# Patient Record
Sex: MALE | Race: WHITE | NOT HISPANIC OR LATINO
[De-identification: names, ages, dates, MRNs, and addresses within clinical notes are randomized per-mention and may not be internally consistent; named-entity substitution may affect disease eponyms.]

---

## 2019-09-11 PROBLEM — Z00.00 ENCOUNTER FOR PREVENTIVE HEALTH EXAMINATION: Status: ACTIVE | Noted: 2019-09-11

## 2019-10-24 ENCOUNTER — APPOINTMENT (OUTPATIENT)
Dept: HEMATOLOGY ONCOLOGY | Facility: CLINIC | Age: 79
End: 2019-10-24
Payer: MEDICARE

## 2019-10-24 NOTE — HISTORY OF PRESENT ILLNESS
[de-identified] : 78 year old male refereed by Dr. Omer Norman for initial consultation to rule out possible MDS versus dendritic cell leukemia/sarcoma.  Patient had asymptomatic dermatitis on his forearms intermittently over the past 6 months.  The dermatitis consists of one to two centimeters minimally elevated violet-pink plaques.  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  Flow cytometry was sent on 9/10/19 by Dr. Norman which showed no morphological or immunophenotypic evidence of lymphoproliferate disorder. high grade MDS or Acute leukemia.  Hgb 11.1, HCT 35.5, MCH 24.8, ESR 90, CRP 83

## 2019-11-19 ENCOUNTER — RESULT REVIEW (OUTPATIENT)
Age: 79
End: 2019-11-19

## 2019-11-19 ENCOUNTER — APPOINTMENT (OUTPATIENT)
Dept: HEMATOLOGY ONCOLOGY | Facility: CLINIC | Age: 79
End: 2019-11-19
Payer: MEDICARE

## 2019-11-19 VITALS
TEMPERATURE: 97.9 F | WEIGHT: 142.6 LBS | DIASTOLIC BLOOD PRESSURE: 71 MMHG | BODY MASS INDEX: 22.12 KG/M2 | RESPIRATION RATE: 18 BRPM | HEIGHT: 67.13 IN | OXYGEN SATURATION: 100 % | HEART RATE: 93 BPM | SYSTOLIC BLOOD PRESSURE: 131 MMHG

## 2019-11-19 DIAGNOSIS — Z85.46 PERSONAL HISTORY OF MALIGNANT NEOPLASM OF PROSTATE: ICD-10-CM

## 2019-11-19 DIAGNOSIS — Z82.49 FAMILY HISTORY OF ISCHEMIC HEART DISEASE AND OTHER DISEASES OF THE CIRCULATORY SYSTEM: ICD-10-CM

## 2019-11-19 DIAGNOSIS — Z81.1 FAMILY HISTORY OF ALCOHOL ABUSE AND DEPENDENCE: ICD-10-CM

## 2019-11-19 DIAGNOSIS — Z80.42 FAMILY HISTORY OF MALIGNANT NEOPLASM OF PROSTATE: ICD-10-CM

## 2019-11-19 DIAGNOSIS — Z78.9 OTHER SPECIFIED HEALTH STATUS: ICD-10-CM

## 2019-11-19 DIAGNOSIS — Z86.79 PERSONAL HISTORY OF OTHER DISEASES OF THE CIRCULATORY SYSTEM: ICD-10-CM

## 2019-11-19 PROCEDURE — 99205 OFFICE O/P NEW HI 60 MIN: CPT

## 2019-11-19 RX ORDER — HYDROXYCHLOROQUINE SULFATE 200 MG/1
200 TABLET ORAL
Refills: 0 | Status: ACTIVE | COMMUNITY

## 2019-11-19 NOTE — ASSESSMENT
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  \par \par Bone density - due now, followed by Dr. Fay Radford \par \par Patient has anemia with Hg 11 gm/dl.  Denies blood loss, c/o fatigue. \par \par Skin biopsy c/w histiocytoid Sweet's Syndrome which might correlate with myelodysplastic syndrome or dendritic cell leukemia/ sarcoma.  \par \par Discussed with patient indication for bone marrow biopsy with NGS. \par Flow cytometry and FISH for MDS for peripheral blood negative but overall low yield for MDS.

## 2019-11-19 NOTE — PHYSICAL EXAM
[Fully active, able to carry on all pre-disease performance without restriction] : Status 0 - Fully active, able to carry on all pre-disease performance without restriction [Normal] : normal appearance, no rash, nodules, vesicles, ulcers, erythema [de-identified] : skin rash

## 2019-11-19 NOTE — HISTORY OF PRESENT ILLNESS
[de-identified] : 78 year old male refereed by Dr. Omer Norman for initial consultation to rule out possible MDS versus dendritic cell leukemia/sarcoma.  Patient had asymptomatic dermatitis on his forearms intermittently over the past 6 months.  The dermatitis consists of one to two centimeters minimally elevated violet-pink plaques.  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  Flow cytometry was sent on 9/10/19 by Dr. Norman which showed no morphological or immunophenotypic evidence of lymphoproliferate disorder, high grade MDS or Acute leukemia.  Hgb 11.1, HCT 35.5, MCH 24.8, ESR 90, CRP 83\par \par Hgb 11.1, HCT 35.5, MCH 24.8, ESR 90, CRP 83.  Patient sees GI had capsule study and up to date with coloscopy was told he had a little "leak" sees Dr. Aguilar, he also reports losing 10-12 lbs over the past 6 years.  He reports having a personal history of prostate cancer in 2002 and had a prostatectomy.  His brother has prostate cancer, denies any other family history of malignant or hematological disorders.  \par \par He follows with Dr. Radford for a connective tissue disorder.

## 2019-11-21 ENCOUNTER — TRANSCRIPTION ENCOUNTER (OUTPATIENT)
Age: 79
End: 2019-11-21

## 2019-11-24 ENCOUNTER — RESULT REVIEW (OUTPATIENT)
Age: 79
End: 2019-11-24

## 2019-11-25 ENCOUNTER — APPOINTMENT (OUTPATIENT)
Dept: HEMATOLOGY ONCOLOGY | Facility: CLINIC | Age: 79
End: 2019-11-25
Payer: MEDICARE

## 2019-11-25 VITALS
DIASTOLIC BLOOD PRESSURE: 75 MMHG | TEMPERATURE: 97.9 F | HEART RATE: 100 BPM | SYSTOLIC BLOOD PRESSURE: 136 MMHG | RESPIRATION RATE: 20 BRPM | HEIGHT: 67.13 IN | WEIGHT: 140.99 LBS | OXYGEN SATURATION: 100 % | BODY MASS INDEX: 21.87 KG/M2

## 2019-11-25 PROCEDURE — 99214 OFFICE O/P EST MOD 30 MIN: CPT | Mod: 25

## 2019-11-25 PROCEDURE — 38222 DX BONE MARROW BX & ASPIR: CPT | Mod: LT

## 2019-11-25 NOTE — CONSULT LETTER
[Dear  ___] : Dear  [unfilled], [Consult Letter:] : I had the pleasure of evaluating your patient, [unfilled]. [Please see my note below.] : Please see my note below. [Consult Closing:] : Thank you very much for allowing me to participate in the care of this patient.  If you have any questions, please do not hesitate to contact me. [Sincerely,] : Sincerely, [FreeTextEntry3] : Brittany Bahena MD\par [facility] [place] at [facility]\par  [DrIveth  ___] : Dr. BETANCOURT [DrIveth ___] : Dr. BETANCOURT

## 2019-11-25 NOTE — HISTORY OF PRESENT ILLNESS
[de-identified] : 78 year old male refereed by Dr. Omer Norman for initial consultation to rule out possible MDS versus dendritic cell leukemia/sarcoma.  Patient had asymptomatic dermatitis on his forearms intermittently over the past 6 months.  The dermatitis consists of one to two centimeters minimally elevated violet-pink plaques.  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  Flow cytometry was sent on 9/10/19 by Dr. Norman which showed no morphological or immunophenotypic evidence of lymphoproliferate disorder, high grade MDS or Acute leukemia.  Hgb 11.1, HCT 35.5, MCH 24.8, ESR 90, CRP 83\par \par Hgb 11.1, HCT 35.5, MCH 24.8, ESR 90, CRP 83.  Patient sees GI had capsule study and up to date with coloscopy was told he had a little "leak" sees Dr. Aguilar, he also reports losing 10-12 lbs over the past 6 years.  He reports having a personal history of prostate cancer in 2002 and had a prostatectomy.  His brother has prostate cancer, denies any other family history of malignant or hematological disorders.  \par \par He follows with Dr. Radford for a connective tissue disorder.

## 2019-11-25 NOTE — PROCEDURE
[Bone Marrow Aspiration] : bone marrow aspiration  [Bone Marrow Biopsy] : bone marrow biopsy [Patient] : the patient [Verbal Consent Obtained] : verbal consent was obtained prior to the procedure [Patient identification verified] : patient identification verified [Procedure verified and consent obtained] : procedure verified and consent obtained [Laterality verified and correct site marked] : laterality verified and correct site marked [Left] : site: left [Correct positioning] : correct positioning [Prone] : prone [Correct implant and/ or special equipment obtained] : correct impact and/ or special equipment obtained [Superior iliac spine was identified] : the superior iliac spine was identified. [The left posterior iliac crest was prepped with betadine and draped, using sterile technique.] : The left posterior iliac crest was prepped with betadine and draped, using sterile technique. [Lidocaine was injected and into the periosteum overlying the site.] : Lidocaine was injected and into the periosteum overlying the site. [Aspirate] : aspirate [Cytogenetics] : cytogenetics [FISH] : FISH [Biopsy] : biopsy [Flow Cytometry] : flow cytometry [FreeTextEntry1] : Sweet Syndrome, Anemia

## 2019-11-25 NOTE — PHYSICAL EXAM
[Fully active, able to carry on all pre-disease performance without restriction] : Status 0 - Fully active, able to carry on all pre-disease performance without restriction [Normal] : affect appropriate [de-identified] : skin rash

## 2019-11-25 NOTE — ASSESSMENT
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  \par \par Bone density - due now, followed by Dr. Fay Radford \par \par Patient has anemia with Hg 11 gm/dl.  Denies blood loss, c/o fatigue. \par \par Skin biopsy c/w histiocytoid Sweet's Syndrome which might correlate with myelodysplastic syndrome or dendritic cell leukemia/ sarcoma.  \par \par Discussed with patient indication for bone marrow biopsy with NGS. \par Flow cytometry and FISH for MDS for peripheral blood negative but overall low yield for MDS. \par \par \par Bone marrow biopsy today \par Labs reviewed - elevated inflammatory markers

## 2019-12-09 ENCOUNTER — RESULT REVIEW (OUTPATIENT)
Age: 79
End: 2019-12-09

## 2019-12-09 ENCOUNTER — APPOINTMENT (OUTPATIENT)
Dept: HEMATOLOGY ONCOLOGY | Facility: CLINIC | Age: 79
End: 2019-12-09
Payer: MEDICARE

## 2019-12-09 VITALS
HEART RATE: 73 BPM | RESPIRATION RATE: 18 BRPM | WEIGHT: 146.98 LBS | SYSTOLIC BLOOD PRESSURE: 137 MMHG | OXYGEN SATURATION: 100 % | BODY MASS INDEX: 22.8 KG/M2 | HEIGHT: 67.13 IN | DIASTOLIC BLOOD PRESSURE: 69 MMHG | TEMPERATURE: 97.6 F

## 2019-12-09 DIAGNOSIS — E78.5 HYPERLIPIDEMIA, UNSPECIFIED: ICD-10-CM

## 2019-12-09 PROCEDURE — 99214 OFFICE O/P EST MOD 30 MIN: CPT

## 2019-12-10 NOTE — REASON FOR VISIT
[Follow-Up Visit] : a follow-up visit for [Myelodysplastic syndrome] : myelodysplastic syndrome [FreeTextEntry2] : Anemia, MDS

## 2019-12-10 NOTE — CONSULT LETTER
[Dear  ___] : Dear  [unfilled], [Consult Letter:] : I had the pleasure of evaluating your patient, [unfilled]. [Please see my note below.] : Please see my note below. [Consult Closing:] : Thank you very much for allowing me to participate in the care of this patient.  If you have any questions, please do not hesitate to contact me. [Sincerely,] : Sincerely, [DrIveth  ___] : Dr. BETANCOURT [DrIveth ___] : Dr. BETANCOURT [FreeTextEntry3] : Brtitany Bahena MD\par [facility] [place] at [facility]\par

## 2019-12-10 NOTE — PHYSICAL EXAM
[Fully active, able to carry on all pre-disease performance without restriction] : Status 0 - Fully active, able to carry on all pre-disease performance without restriction [Normal] : affect appropriate [de-identified] : skin rash

## 2019-12-10 NOTE — ASSESSMENT
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  \par Skin biopsy c/w histiocytoid Sweet's Syndrome which might correlate with myelodysplastic syndrome or dendritic cell leukemia/ sarcoma.\par \par Bone marrow biopsy - hypercellular marrow with normal FISH and absent iron stores.\par Reviewed with patient -c/w MDS, will replace IV iron prior to starting Aranesp.\par Request NGS \par Hemoglobin declined to 9.9, reviewed GI work up - colonoscopy 2-3 years ago, - EGD, colonoscopy and capsule study.\par   \par \par \par

## 2019-12-10 NOTE — HISTORY OF PRESENT ILLNESS
[de-identified] : 78 year old male refereed by Dr. Omer Norman for initial consultation to rule out possible MDS versus dendritic cell leukemia/sarcoma.  Patient had asymptomatic dermatitis on his forearms intermittently over the past 6 months.  The dermatitis consists of one to two centimeters minimally elevated violet-pink plaques.  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  Flow cytometry was sent on 9/10/19 by Dr. Norman which showed no morphological or immunophenotypic evidence of lymphoproliferate disorder, high grade MDS or Acute leukemia.  Hgb 11.1, HCT 35.5, MCH 24.8, ESR 90, CRP 83\par \par Hgb 11.1, HCT 35.5, MCH 24.8, ESR 90, CRP 83.  Patient sees GI had capsule study and up to date with coloscopy was told he had a little "leak" sees Dr. Aguilar, he also reports losing 10-12 lbs over the past 6 years.  He reports having a personal history of prostate cancer in 2002 and had a prostatectomy.  His brother has prostate cancer, denies any other family history of malignant or hematological disorders.  \par \par He follows with Dr. Radford for a connective tissue disorder.   [de-identified] : Patient being seen for visit s/p bone marrow biopsy. Offers no complaints at this time.

## 2020-01-09 ENCOUNTER — APPOINTMENT (OUTPATIENT)
Dept: HEMATOLOGY ONCOLOGY | Facility: CLINIC | Age: 80
End: 2020-01-09
Payer: MEDICARE

## 2020-01-09 ENCOUNTER — RESULT REVIEW (OUTPATIENT)
Age: 80
End: 2020-01-09

## 2020-01-09 VITALS
OXYGEN SATURATION: 100 % | HEART RATE: 84 BPM | BODY MASS INDEX: 22.6 KG/M2 | TEMPERATURE: 97.9 F | RESPIRATION RATE: 16 BRPM | SYSTOLIC BLOOD PRESSURE: 105 MMHG | WEIGHT: 145.7 LBS | HEIGHT: 67.13 IN | DIASTOLIC BLOOD PRESSURE: 58 MMHG

## 2020-01-09 PROCEDURE — 99214 OFFICE O/P EST MOD 30 MIN: CPT

## 2020-01-09 NOTE — HISTORY OF PRESENT ILLNESS
[de-identified] : 78 year old male refereed by Dr. Omer Norman for initial consultation to rule out possible MDS versus dendritic cell leukemia/sarcoma.  Patient had asymptomatic dermatitis on his forearms intermittently over the past 6 months.  The dermatitis consists of one to two centimeters minimally elevated violet-pink plaques.  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  Flow cytometry was sent on 9/10/19 by Dr. Norman which showed no morphological or immunophenotypic evidence of lymphoproliferate disorder, high grade MDS or Acute leukemia.  Hgb 11.1, HCT 35.5, MCH 24.8, ESR 90, CRP 83\par \par Hgb 11.1, HCT 35.5, MCH 24.8, ESR 90, CRP 83.  Patient sees GI had capsule study and up to date with coloscopy was told he had a little "leak" sees Dr. Aguilar, he also reports losing 10-12 lbs over the past 6 years.  He reports having a personal history of prostate cancer in 2002 and had a prostatectomy.  His brother has prostate cancer, denies any other family history of malignant or hematological disorders.  \par \par He follows with Dr. Radford for a connective tissue disorder.   [de-identified] : Patient being seen for follow up of MDS- s/p iron infusion states no improvement.  Having lower back pain s/p epidural

## 2020-01-09 NOTE — CONSULT LETTER
[FreeTextEntry3] : Brittany Bahena MD\par [facility] [place] at [facility]\par

## 2020-01-09 NOTE — ASSESSMENT
[FreeTextEntry1] : 79 yo male with history 12 years with sweats, fevers,  dermatitis, weight loss diagnosed with undifferentiated mixed connective tissue disease treated with prednisone 5mg.  \par Skin biopsy c/w histiocytoid Sweet's Syndrome which might correlate with myelodysplastic syndrome or dendritic cell leukemia/ sarcoma.\par \par Bone marrow biopsy - hypercellular marrow with normal FISH and absent iron stores.\par Reviewed with patient -c/w MDS, will replace IV iron prior to starting Aranesp.\par \par Hemoglobin declined to 9.9, reviewed GI work up - colonoscopy 2-3 years ago, - EGD, colonoscopy and capsule study.Colonoscopy 2017 - Diverticulosis and internal hemorrhoids \par EGD - 2019, [facility]  \par Hg 10.9 1/9/2020, saturation low 9% - s/p one IV iron, schedule 2nd dose \par Elevated inflammatory markers ESR, CRP  - cc Dr. Fay Radford

## 2020-02-13 ENCOUNTER — APPOINTMENT (OUTPATIENT)
Dept: HEMATOLOGY ONCOLOGY | Facility: CLINIC | Age: 80
End: 2020-02-13
Payer: MEDICARE

## 2020-02-13 ENCOUNTER — RESULT REVIEW (OUTPATIENT)
Age: 80
End: 2020-02-13

## 2020-02-13 VITALS
SYSTOLIC BLOOD PRESSURE: 119 MMHG | HEART RATE: 79 BPM | RESPIRATION RATE: 16 BRPM | WEIGHT: 145 LBS | DIASTOLIC BLOOD PRESSURE: 61 MMHG | OXYGEN SATURATION: 99 % | TEMPERATURE: 98.8 F | HEIGHT: 67.13 IN | BODY MASS INDEX: 22.49 KG/M2

## 2020-02-13 PROCEDURE — 99214 OFFICE O/P EST MOD 30 MIN: CPT

## 2020-02-13 NOTE — PHYSICAL EXAM
[Fully active, able to carry on all pre-disease performance without restriction] : Status 0 - Fully active, able to carry on all pre-disease performance without restriction [Normal] : grossly intact [de-identified] : skin rash

## 2020-02-13 NOTE — REASON FOR VISIT
[Myelodysplastic syndrome] : myelodysplastic syndrome [Follow-Up Visit] : a follow-up visit for [FreeTextEntry2] : Anemia, MDS

## 2020-02-13 NOTE — REVIEW OF SYSTEMS
[Fatigue] : fatigue [Muscle Pain] : muscle pain [Negative] : Allergic/Immunologic [FreeTextEntry9] : lower back pain

## 2020-02-13 NOTE — CONSULT LETTER
[Dear  ___] : Dear  [unfilled], [Consult Letter:] : I had the pleasure of evaluating your patient, [unfilled]. [Please see my note below.] : Please see my note below. [Consult Closing:] : Thank you very much for allowing me to participate in the care of this patient.  If you have any questions, please do not hesitate to contact me. [Sincerely,] : Sincerely, [DrIveth  ___] : Dr. BETANCOURT [DrIveth ___] : Dr. BETANCOURT [FreeTextEntry3] : Brittany Bahena MD\par [facility] [place] at [facility]\par

## 2020-02-13 NOTE — HISTORY OF PRESENT ILLNESS
[de-identified] : 79 year old male refereed by Dr. Omer Norman for initial consultation to rule out possible MDS versus dendritic cell leukemia/sarcoma.  Patient had asymptomatic dermatitis on his forearms intermittently over the past 6 months.  The dermatitis consists of one to two centimeters minimally elevated violet-pink plaques.  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  Flow cytometry was sent on 9/10/19 by Dr. Norman which showed no morphological or immunophenotypic evidence of lymphoproliferate disorder, high grade MDS or Acute leukemia.  Hgb 11.1, HCT 35.5, MCH 24.8, ESR 90, CRP 83\par \par Hgb 11.1, HCT 35.5, MCH 24.8, ESR 90, CRP 83.  Patient sees GI had capsule study and up to date with coloscopy was told he had a little "leak" sees Dr. Aguilar, he also reports losing 10-12 lbs over the past 6 years.  He reports having a personal history of prostate cancer in 2002 and had a prostatectomy.  His brother has prostate cancer, denies any other family history of malignant or hematological disorders.  \par \par He follows with Dr. Radford for a connective tissue disorder.   [de-identified] : Patient being seen for follow up of MDS and anemia- having lower back pain from spinal stenosis goes to PT.\par Joints pain - knees and lower back, 1h morning warm up

## 2020-02-13 NOTE — ASSESSMENT
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  \par Skin biopsy c/w histiocytoid Sweet's Syndrome which might correlate with myelodysplastic syndrome or dendritic cell leukemia/ sarcoma.\par \par Bone marrow biopsy - hypercellular marrow with normal FISH and absent iron stores.\par Reviewed with patient -c/w MDS, will replace IV iron prior to starting Aranesp.\par \par Hemoglobin declined to 9.9, reviewed GI work up - colonoscopy 2-3 years ago, - EGD, colonoscopy and capsule study.Colonoscopy 2017 - Diverticulosis and internal hemorrhoids \par EGD - 2019, [facility]  \par \par Hg 11.9- improved after 2 IV iron infusion ( from 10.9)\par Persistently elevated inflammatory markers ESR, CRP  - cc Dr. Fay Radford

## 2020-06-18 ENCOUNTER — RESULT REVIEW (OUTPATIENT)
Age: 80
End: 2020-06-18

## 2020-06-18 ENCOUNTER — APPOINTMENT (OUTPATIENT)
Dept: HEMATOLOGY ONCOLOGY | Facility: CLINIC | Age: 80
End: 2020-06-18
Payer: MEDICARE

## 2020-06-18 VITALS
RESPIRATION RATE: 16 BRPM | SYSTOLIC BLOOD PRESSURE: 112 MMHG | OXYGEN SATURATION: 98 % | WEIGHT: 147 LBS | HEIGHT: 67.13 IN | BODY MASS INDEX: 22.8 KG/M2 | TEMPERATURE: 98.8 F | HEART RATE: 73 BPM | DIASTOLIC BLOOD PRESSURE: 57 MMHG

## 2020-06-18 DIAGNOSIS — F42.9 OBSESSIVE-COMPULSIVE DISORDER, UNSPECIFIED: ICD-10-CM

## 2020-06-18 PROCEDURE — 99214 OFFICE O/P EST MOD 30 MIN: CPT

## 2020-06-18 NOTE — HISTORY OF PRESENT ILLNESS
[de-identified] : 79 year old male refereed by Dr. Omer Norman for initial consultation to rule out possible MDS versus dendritic cell leukemia/sarcoma.  Patient had asymptomatic dermatitis on his forearms intermittently over the past 6 months.  The dermatitis consists of one to two centimeters minimally elevated violet-pink plaques.  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  Flow cytometry was sent on 9/10/19 by Dr. Norman which showed no morphological or immunophenotypic evidence of lymphoproliferate disorder, high grade MDS or Acute leukemia.  Hgb 11.1, HCT 35.5, MCH 24.8, ESR 90, CRP 83\par \par Hgb 11.1, HCT 35.5, MCH 24.8, ESR 90, CRP 83.  Patient sees GI had capsule study and up to date with coloscopy was told he had a little "leak" sees Dr. Aguilar, he also reports losing 10-12 lbs over the past 6 years.  He reports having a personal history of prostate cancer in 2002 and had a prostatectomy.  His brother has prostate cancer, denies any other family history of malignant or hematological disorders.  \par \par He follows with Dr. Radford for a connective tissue disorder.   [de-identified] : Patient being seen for follow up of MDS and anemia- having lower back pain from spinal stenosis goes to PT.\par Joints pain - knees and lower back, 1h morning warm up

## 2020-06-18 NOTE — ASSESSMENT
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  \par Skin biopsy c/w histiocytoid Sweet's Syndrome which might correlate with myelodysplastic syndrome or dendritic cell leukemia/ sarcoma.\par \par Bone marrow biopsy - hypercellular marrow with normal FISH and absent iron stores.\par Reviewed with patient -c/w MDS, will replace IV iron prior to starting Aranesp.\par \par Hg 11.7- no Aranesp \par increased rash = forearm\par \par reviewed GI work up - colonoscopy 2-3 years ago, - EGD, colonoscopy and capsule study.Colonoscopy 2017 - Diverticulosis and internal hemorrhoids \par EGD - 2019, [facility]  \par \par \par Persistently elevated inflammatory markers ESR, CRP  - f/u Dr. Fay Radford

## 2020-06-18 NOTE — PHYSICAL EXAM
[Fully active, able to carry on all pre-disease performance without restriction] : Status 0 - Fully active, able to carry on all pre-disease performance without restriction [Normal] : affect appropriate [de-identified] : skin rash

## 2020-06-18 NOTE — CONSULT LETTER
[Dear  ___] : Dear  [unfilled], [Please see my note below.] : Please see my note below. [Consult Letter:] : I had the pleasure of evaluating your patient, [unfilled]. [Sincerely,] : Sincerely, [Consult Closing:] : Thank you very much for allowing me to participate in the care of this patient.  If you have any questions, please do not hesitate to contact me. [DrIveth ___] : Dr. BETANCOURT [DrIveth  ___] : Dr. BETANCOURT [FreeTextEntry3] : Brittany Bahena MD\par [facility] [place] at [facility]\par

## 2020-06-18 NOTE — REVIEW OF SYSTEMS
[Fatigue] : fatigue [Muscle Pain] : muscle pain [Negative] : Heme/Lymph [FreeTextEntry9] : lower back pain

## 2020-11-30 ENCOUNTER — RESULT REVIEW (OUTPATIENT)
Age: 80
End: 2020-11-30

## 2020-11-30 ENCOUNTER — APPOINTMENT (OUTPATIENT)
Dept: HEMATOLOGY ONCOLOGY | Facility: CLINIC | Age: 80
End: 2020-11-30
Payer: MEDICARE

## 2020-11-30 VITALS
HEART RATE: 74 BPM | SYSTOLIC BLOOD PRESSURE: 115 MMHG | WEIGHT: 140.99 LBS | HEIGHT: 67.13 IN | RESPIRATION RATE: 18 BRPM | DIASTOLIC BLOOD PRESSURE: 74 MMHG | TEMPERATURE: 97.7 F | BODY MASS INDEX: 21.87 KG/M2 | OXYGEN SATURATION: 97 %

## 2020-11-30 PROCEDURE — 99214 OFFICE O/P EST MOD 30 MIN: CPT

## 2020-11-30 RX ORDER — ATORVASTATIN CALCIUM 10 MG/1
10 TABLET, FILM COATED ORAL
Qty: 90 | Refills: 0 | Status: ACTIVE | COMMUNITY
Start: 2020-06-08

## 2020-11-30 RX ORDER — AMLODIPINE BESYLATE 5 MG/1
5 TABLET ORAL
Qty: 90 | Refills: 0 | Status: ACTIVE | COMMUNITY
Start: 2020-11-12

## 2020-11-30 RX ORDER — LISINOPRIL 10 MG/1
10 TABLET ORAL
Qty: 60 | Refills: 0 | Status: ACTIVE | COMMUNITY
Start: 2020-10-14

## 2020-11-30 RX ORDER — METHYLPREDNISOLONE 4 MG/1
4 TABLET ORAL
Qty: 60 | Refills: 0 | Status: ACTIVE | COMMUNITY
Start: 2020-10-16

## 2020-11-30 RX ORDER — COLCHICINE 0.6 MG/1
0.6 TABLET ORAL
Qty: 90 | Refills: 0 | Status: ACTIVE | COMMUNITY
Start: 2020-11-17

## 2020-11-30 NOTE — HISTORY OF PRESENT ILLNESS
[de-identified] : 79 year old male refereed by Dr. Omer Norman for initial consultation to rule out possible MDS versus dendritic cell leukemia/sarcoma.  Patient had asymptomatic dermatitis on his forearms intermittently over the past 6 months.  The dermatitis consists of one to two centimeters minimally elevated violet-pink plaques.  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  Flow cytometry was sent on 9/10/19 by Dr. Norman which showed no morphological or immunophenotypic evidence of lymphoproliferate disorder, high grade MDS or Acute leukemia.  Hgb 11.1, HCT 35.5, MCH 24.8, ESR 90, CRP 83\par \par Hgb 11.1, HCT 35.5, MCH 24.8, ESR 90, CRP 83.  Patient sees GI had capsule study and up to date with coloscopy was told he had a little "leak" sees Dr. Aguilar, he also reports losing 10-12 lbs over the past 6 years.  He reports having a personal history of prostate cancer in 2002 and had a prostatectomy.  His brother has prostate cancer, denies any other family history of malignant or hematological disorders.  \par \par He follows with Dr. Radford for a connective tissue disorder.   [de-identified] : Patient being seen for follow up of MDS and anemia- having lower back pain from spinal stenosis goes to PT.\par Joints pain - knees and lower back, 1h morning warm up \par Decreased appetite, weight loss

## 2020-11-30 NOTE — PHYSICAL EXAM
[Fully active, able to carry on all pre-disease performance without restriction] : Status 0 - Fully active, able to carry on all pre-disease performance without restriction [Normal] : affect appropriate [de-identified] : skin rash

## 2020-11-30 NOTE — CONSULT LETTER
[Dear  ___] : Dear  [unfilled], [Consult Letter:] : I had the pleasure of evaluating your patient, [unfilled]. [Please see my note below.] : Please see my note below. [Consult Closing:] : Thank you very much for allowing me to participate in the care of this patient.  If you have any questions, please do not hesitate to contact me. [Sincerely,] : Sincerely, [DrIveth  ___] : Dr. BETANCOURT [DrIveth ___] : Dr. BETANCOURT [FreeTextEntry3] : Brittany Bahena MD\par [facility] [place] at [facility]\par

## 2020-11-30 NOTE — ASSESSMENT
[FreeTextEntry1] : 80 yo male with history 12 years with sweats, fevers,  dermatitis, weight loss diagnosed with undifferentiated mixed connective tissue disease treated with prednisone 5mg.  \par Skin biopsy c/w histiocytoid Sweet's Syndrome which might correlate with myelodysplastic syndrome or dendritic cell leukemia/ sarcoma.\par \par Bone marrow biopsy - hypercellular marrow with normal FISH and absent iron stores.\par Reviewed with patient -c/w MDS, will replace IV iron prior to starting Aranesp.\par \par Hg 12- no Aranesp \par increased rash = forearm\par \par Weight loss, abdominal pain - CT scan now \par \par reviewed GI work up - colonoscopy 2-3 years ago, - EGD, colonoscopy and capsule study.Colonoscopy 2017 - Diverticulosis and internal hemorrhoids \par EGD - 2019, [facility]  \par \par Persistently elevated inflammatory markers ESR, CRP  - f/u Dr. Fay Radford

## 2021-01-11 ENCOUNTER — APPOINTMENT (OUTPATIENT)
Dept: GASTROENTEROLOGY | Facility: CLINIC | Age: 81
End: 2021-01-11

## 2021-01-12 ENCOUNTER — RESULT REVIEW (OUTPATIENT)
Age: 81
End: 2021-01-12

## 2021-01-12 ENCOUNTER — APPOINTMENT (OUTPATIENT)
Dept: HEMATOLOGY ONCOLOGY | Facility: CLINIC | Age: 81
End: 2021-01-12
Payer: MEDICARE

## 2021-01-12 VITALS
BODY MASS INDEX: 21.84 KG/M2 | RESPIRATION RATE: 18 BRPM | OXYGEN SATURATION: 98 % | TEMPERATURE: 97.6 F | HEART RATE: 99 BPM | SYSTOLIC BLOOD PRESSURE: 125 MMHG | DIASTOLIC BLOOD PRESSURE: 68 MMHG | WEIGHT: 140.8 LBS | HEIGHT: 67.13 IN

## 2021-01-12 DIAGNOSIS — R63.4 ABNORMAL WEIGHT LOSS: ICD-10-CM

## 2021-01-12 DIAGNOSIS — R10.84 GENERALIZED ABDOMINAL PAIN: ICD-10-CM

## 2021-01-12 PROCEDURE — 99214 OFFICE O/P EST MOD 30 MIN: CPT

## 2021-01-12 NOTE — HISTORY OF PRESENT ILLNESS
[de-identified] : 79 year old male refereed by Dr. Omer Norman for initial consultation to rule out possible MDS versus dendritic cell leukemia/sarcoma.  Patient had asymptomatic dermatitis on his forearms intermittently over the past 6 months.  The dermatitis consists of one to two centimeters minimally elevated violet-pink plaques.  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  Flow cytometry was sent on 9/10/19 by Dr. Norman which showed no morphological or immunophenotypic evidence of lymphoproliferate disorder, high grade MDS or Acute leukemia.  Hgb 11.1, HCT 35.5, MCH 24.8, ESR 90, CRP 83\par \par Hgb 11.1, HCT 35.5, MCH 24.8, ESR 90, CRP 83.  Patient sees GI had capsule study and up to date with coloscopy was told he had a little "leak" sees Dr. Aguilar, he also reports losing 10-12 lbs over the past 6 years.  He reports having a personal history of prostate cancer in 2002 and had a prostatectomy.  His brother has prostate cancer, denies any other family history of malignant or hematological disorders.  \par \par He follows with Dr. Radford for a connective tissue disorder.   [de-identified] : Patient being seen for follow up of MDS and anemia. Patient states that he unintentionally lost 4 lbs over the last 3 months and experiences occasional chills. He denies fever and night sweats.

## 2021-01-12 NOTE — PHYSICAL EXAM
[Fully active, able to carry on all pre-disease performance without restriction] : Status 0 - Fully active, able to carry on all pre-disease performance without restriction [Normal] : affect appropriate [de-identified] : skin rash

## 2021-01-12 NOTE — ASSESSMENT
[FreeTextEntry1] : 81 yo male with history 12 years with sweats, fevers,  dermatitis, weight loss diagnosed with undifferentiated mixed connective tissue disease treated with prednisone 5mg.  \par Skin biopsy c/w histiocytoid Sweet's Syndrome which might correlate with myelodysplastic syndrome or dendritic cell leukemia/ sarcoma.\par \par Bone marrow biopsy - hypercellular marrow with normal FISH and absent iron stores.\par Reviewed with patient -c/w MDS, will replace IV iron prior to starting Aranesp.\par \par Hg 12.8 gm/dl- no Aranesp today\par \par Weight loss, abdominal pain - CT scan at [facility], images and report reviewed- GERD, diverticulosis.  CT chest - stable, pleural- based nodules, calcification in coronaries.  \par \par Reviewed GI work up - colonoscopy 2-3 years ago, - EGD, colonoscopy and capsule study.Colonoscopy 2017 - Diverticulosis and internal hemorrhoids \par EGD - 2019, [facility]  \par \par Persistently elevated inflammatory markers ESR, CRP  - Dr. Fay Radford, to resume colchicine

## 2021-01-12 NOTE — CONSULT LETTER
[Dear  ___] : Dear  [unfilled], [Consult Letter:] : I had the pleasure of evaluating your patient, [unfilled]. [Please see my note below.] : Please see my note below. [Consult Closing:] : Thank you very much for allowing me to participate in the care of this patient.  If you have any questions, please do not hesitate to contact me. [Sincerely,] : Sincerely, [DrIveth  ___] : Dr. BETANCOURT [DrIveth ___] : Dr. BETANCOURT [FreeTextEntry3] : Brittany Bahena MD\par [facility] [place] at [facility]\par

## 2021-01-21 ENCOUNTER — APPOINTMENT (OUTPATIENT)
Dept: GASTROENTEROLOGY | Facility: CLINIC | Age: 81
End: 2021-01-21
Payer: MEDICARE

## 2021-01-21 VITALS
WEIGHT: 142 LBS | BODY MASS INDEX: 21.52 KG/M2 | DIASTOLIC BLOOD PRESSURE: 62 MMHG | OXYGEN SATURATION: 95 % | TEMPERATURE: 97.9 F | HEIGHT: 68 IN | SYSTOLIC BLOOD PRESSURE: 122 MMHG | HEART RATE: 90 BPM

## 2021-01-21 PROCEDURE — 99204 OFFICE O/P NEW MOD 45 MIN: CPT

## 2021-01-21 RX ORDER — ATORVASTATIN CALCIUM 40 MG/1
40 TABLET, FILM COATED ORAL
Refills: 0 | Status: DISCONTINUED | COMMUNITY
End: 2021-01-21

## 2021-01-21 RX ORDER — PAROXETINE HYDROCHLORIDE 40 MG/1
40 TABLET, FILM COATED ORAL
Qty: 30 | Refills: 0 | Status: DISCONTINUED | COMMUNITY
Start: 2020-06-29 | End: 2021-01-21

## 2021-01-21 RX ORDER — ZOLPIDEM TARTRATE 5 MG/1
5 TABLET ORAL
Qty: 30 | Refills: 0 | Status: DISCONTINUED | COMMUNITY
Start: 2020-10-23 | End: 2021-01-21

## 2021-01-21 RX ORDER — HYDROCORTISONE 25 MG/G
2.5 OINTMENT TOPICAL
Qty: 20 | Refills: 0 | Status: DISCONTINUED | COMMUNITY
Start: 2020-06-30 | End: 2021-01-21

## 2021-01-21 RX ORDER — PAROXETINE HYDROCHLORIDE 10 MG/1
10 TABLET, FILM COATED ORAL
Refills: 0 | Status: DISCONTINUED | COMMUNITY
End: 2021-01-21

## 2021-01-22 NOTE — HISTORY OF PRESENT ILLNESS
[FreeTextEntry1] : Mr. Jaime is a pleasant 80M h/o MDS, anemia, Sweet syndrome, mixed connective tissue disease, HLD, HTN, cholecystectomy, prostatectomy who is referred by Dr. Bahena for evaluation of anemia.\par \par Main complaint today is mild weight loss, feels that he has lost muscle mass.  Also c/o increased abdominal gas and a sense of incomplete evacuation.  He started metamucil approximately 5 days ago.  Avoids dairy, although still consumes occasionally.\par \brittany Has had chronic mild anemia with iron deficiency, has been maintained on IV iron infusions as needed and Aranesp.\par \par He had a prior GI workup including a colonoscopy 2017 showing diverticulosis and internal hemorrhoids, EGD 2019 without a clear source of his iron deficiency.\par \par He states he had a small bowel video capsule study likely in 2019 which showed "leakage" of blood into his small bowel, however he does not have these records, nor was he recommended for a small bowel enteroscopy.\par \par He has not had melena or BRBPR.\par \par If he does have a small bowel bleeding source, he would elect for an enteroscopy with treatment as needed.\par \brittany Recently had a CT A/P 12/3/20 (see scanned report) showing signs c/w reflux, no other abnormalities seen.\par \brittany Does not smoke or drink.\par \par No FHx of any GI malignancies.

## 2021-01-22 NOTE — ASSESSMENT
[FreeTextEntry1] : Will continue his metamucil as he has taken this for less than a week.\par \par Will obtain prior records.\par \par Would plan on a repeat small bowel video capsule study given his prior abnormal finding and documented iron deficiency anemia requiring iron supplementation.\par \par Thank you for referring Mr. MCNAIR.  Please do not hesitate to call to further discuss his/her care.\par \par Note faxed to requesting MD.\par \par

## 2021-05-12 NOTE — PROCEDURE
Physical Therapy- APPROVED  Received an in basket message from [facility] for PT approval [Left] : site: left

## 2021-05-18 ENCOUNTER — RESULT REVIEW (OUTPATIENT)
Age: 81
End: 2021-05-18

## 2021-05-18 ENCOUNTER — APPOINTMENT (OUTPATIENT)
Dept: HEMATOLOGY ONCOLOGY | Facility: CLINIC | Age: 81
End: 2021-05-18
Payer: MEDICARE

## 2021-05-18 VITALS
BODY MASS INDEX: 20.56 KG/M2 | OXYGEN SATURATION: 100 % | DIASTOLIC BLOOD PRESSURE: 71 MMHG | SYSTOLIC BLOOD PRESSURE: 131 MMHG | RESPIRATION RATE: 18 BRPM | TEMPERATURE: 97.8 F | HEART RATE: 94 BPM | HEIGHT: 69.6 IN | WEIGHT: 142 LBS

## 2021-05-18 PROCEDURE — 99214 OFFICE O/P EST MOD 30 MIN: CPT

## 2021-05-18 RX ORDER — METHYLPREDNISOLONE 4 MG/1
4 TABLET ORAL
Refills: 0 | Status: COMPLETED | COMMUNITY
End: 2021-05-18

## 2021-05-18 NOTE — ASSESSMENT
PREOPERATIVE  CONSULTATION    REFERRING PHYSICIAN:  Dr. Ranulfo Ruiz    PROCEDURE:  Bilateral blepharoplasty with brow ptosis repair    DATE OF PROCEDURE:  May 10, 2017    HISTORY OF PRESENT ILLNESS:  The patient is a 48 year old male who is being evaluated pre-operatively for medical clearance. Nacho has been in excellent health. He is a smoker but does not have any history of hypertension, coronary artery disease or diabetes mellitus. There is no family history of premature coronary artery disease. Patient is relatively active and is asymptomatic. He has not had any symptoms to suggest angina either classic or atypical. He denies PND or orthopnea. He has not had any dyspnea or dyspnea on exertion. Patient has not had any palpitations syncope or near syncope.    ACTIVE PROBLEMS  Patient Active Problem List   Diagnosis   • Tobacco use disorder   • Dermatochalasis of eyelids of both eyes   • Nocturnal leg cramps   • Daytime somnolence   • Libido, decreased   • Diverticulosis of large intestine without hemorrhage   • ED (erectile dysfunction) of organic origin       ALLERGIES:  ALLERGIES:  No Known Allergies    CURRENT MEDICATIONS:  Current Outpatient Prescriptions   Medication Sig Dispense Refill   • rOPINIRole (REQUIP) 0.25 MG tablet TAKE 1 TABLET BY MOUTH EVERY NIGHT AT BEDTIME FOR LEG CRAMPS 30 tablet 0   • gabapentin (NEURONTIN) 300 MG capsule Take 1 cap TID PRN nerve pain 30 capsule 0   • atorvastatin (LIPITOR) 10 MG tablet Take 1 tablet by mouth nightly. 30 tablet 3     No current facility-administered medications for this visit.        MEDICAL HISTORY:  Past Medical History:   Diagnosis Date   • Anxiety    • Arthritis    • Chronic insomnia    • Diverticulitis    • Drooping eyelid disease        SURGICAL HISTORY:  Past Surgical History:   Procedure Laterality Date   • APPENDECTOMY  10/04/2004   • ARTHROSCOPIC REPAIR LABRAL  01/01/2009    Left shoulder   • INGUINAL HERNIA REPAIR  02/01/2007    Left   • NASAL  [FreeTextEntry1] : 81 yo male with history 12 years with sweats, fevers,  dermatitis, weight loss diagnosed with undifferentiated mixed connective tissue disease treated with prednisone 5mg.  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 \par \par Bone marrow biopsy - hypercellular marrow with normal FISH and absent iron stores.\par Reviewed with patient -c/w MDS, will replace IV iron prior to starting Aranesp.\par \par Worse balance - undergoing neuro evaluation \par Hg 13.3 gm/dl- no Aranesp today\par  \par Persistently elevated inflammatory markers ESR, CRP  - Dr. Fay Radford, on colchicine  SEPTUM SURGERY  01/01/2011   • ROTATOR CUFF REPAIR  07/01/2006    Right   • SIGMOIDECTOMY  10/04/2004       FAMILY HISTORY:  Family History   Problem Relation Age of Onset   • Cancer Sister    • Cancer Sister      breast cancer   • Cancer Mother      ovarian   • Blindness Mother    • Thyroid Mother    • Arthritis Mother    • Glaucoma Mother        SOCIAL HISTORY:  Social History     Social History   • Marital status:      Spouse name: N/A   • Number of children: N/A   • Years of education: N/A     Social History Main Topics   • Smoking status: Current Every Day Smoker     Packs/day: 1.00     Years: 17.00     Types: Cigarettes   • Smokeless tobacco: Never Used      Comment: trying to cut back on his own   • Alcohol use No   • Drug use: None   • Sexual activity: Not Asked     Other Topics Concern   • None     Social History Narrative       REVIEW OF SYSTEMS:  Constitutional:  Denies fevers, chills or night sweats. No significant weight loss or gain. Energy level is described as good. There are no complaints of insomnia.  HEENT:Denies any orbital pain. Denies any difficulty with odynophagia or dysphagia. There has been no significant change in hearing and no recent difficulty with rhinitis or rhinorrhea.  Respiratory:  Denies cough, either chronic or acute. No pleuritic chest pain. Patient denies any dyspnea or dyspnea on exertion. There has not been any audible wheezing.  Cardiovascular:  Denies chest pain to suggest angina, either classic or atypical. Patient denies palpitations, syncope or near syncope. Patient denies paroxysmal nocturnal dyspnea or orthopnea.  Gastrointestinal:  Denies abdominal pain, nausea, vomiting, diarrhea or rectal bleeding. No recent history of gastroesophageal reflux.  Genitourinary:  Denies any urinary frequency, dysuria, incontinence, nocturia or hematuria.  Musculoskeletal:  Denies any significant myalgias or arthralgias.  Integument:  Denies lumps, bumps or rashes.  Neurologic:   Denies headache, difficulty with balance or paresthesias. Denies any recent changes in mood, memory or cognitive function.  Occupation:  Subjective Functional Capacity:Somewhat active.      PHYSICAL EXAMINATION:  VITAL SIGNS:    Vitals:    05/03/17 1738   BP: 132/80   Pulse: 76   Resp: 20   Weight: 86.8 kg   Height: 5' 9\" (1.753 m)     Constitutional: Patient appears to be of stated age, in no acute distress and appears to be in good health.  HEENT:  Normocephalic. Atraumatic. Oral cavity with good hydration and the uvula is midline. The oral cavity is normal in appearance.  Neck: Good range of motion without tenderness and supple. There is no adenopathy. There is no supraclavicular or cervical adenopathy. Parotid is normal to palpation including the tail of the parotid.  Cardiovascular: Regular rate and rhythm without murmurs, gallops or rubs. The carotids are full, equal without bruits.  Respiratory: Lungs are clear to auscultation both anteriorly and posteriorly with normal breath sounds. Respiratory effort is normal. No rales, rhonchi or wheezing. No costovertebral angle tenderness.  Gastrointestinal:  The abdomen is soft.  Integument:  There are no lumps, bumps or rashes.  Musculoskeletal:  There are no obvious effusions, edema or extreme difficulty with range of motion of any of her extremities.  Neurologic:  Alert and oriented. Normal motor function. Deep tendon reflexes 2+ and symmetrical. Finger-to-nose good and without dysmetria. Gait is normal. Hand strength is symmetrical and normal. There are no obvious cognitive deficits during this examination.  There are no discontinued medications.     Chest X-Ray  Recent chest x-ray in emergency room for chest pain was described as no acute disease.    ECG:  Normal sinus rhythm  Incomplete right bundle branch block  No acute change to suggest infarction or ischemia  Normal ECG    Labs:  Component      Latest Ref Rng & Units 3/31/2017 4/24/2017   WBC       4.2 - 11.0 K/mcL 10.0    RBC      4.50 - 5.90 mil/mcL 5.18    HGB      13.0 - 17.0 g/dL 16.1    HCT      39.0 - 51.0 % 48.3    MCV      78.0 - 100.0 fl 93.2    MCH      26.0 - 34.0 pg 31.1    MCHC      32.0 - 36.5 g/dL 33.3    RDW-CV      11.0 - 15.0 % 13.0    PLT      140 - 450 K/mcL 222    ESR      0 - 15 mm/hr  6   Fasting Status      hrs 12    Sodium      135 - 145 mmol/L 144    Potassium      3.4 - 5.1 mmol/L 4.5    Chloride      98 - 107 mmol/L 105    CO2      21 - 32 mmol/L 28    ANION GAP      10 - 20 mmol/L 16    Glucose      65 - 99 mg/dL 99    BUN      6 - 20 mg/dL 14    Creatinine      0.67 - 1.17 mg/dL 0.99    GFR Estimate,        >90    GFR Estimate, Non        >90    BUN/CREATININE RATIO      7 - 25 14    CALCIUM      8.4 - 10.2 mg/dL 9.0    TOTAL BILIRUBIN      0.2 - 1.0 mg/dL 0.4    AST/SGOT      <38 Units/L 33    ALT/SGPT      <79 Units/L 29    ALK PHOSPHATASE      45 - 117 Units/L 64    TOTAL PROTEIN      6.4 - 8.2 g/dL 6.7    Albumin      3.6 - 5.1 g/dL 3.9    GLOBULIN      2.0 - 4.0 g/dL 2.8    A/G Ratio, Serum      1.0 - 2.4 1.4    C-REACTIVE PROTEIN      <1.0 mg/dL  0.4         ASSESSMENT AND PLAN:    1. 48-year-old man is seen for preoperative clearance prior to bilateral blepharoplasty. Patient is in excellent health. He is active and asymptomatic. There is no suggestion of cardiopulmonary compromise. I think the patient is an excellent candidate for surgery.    Cc Dr. Ranulfo Ruiz

## 2021-05-18 NOTE — PHYSICAL EXAM
[Fully active, able to carry on all pre-disease performance without restriction] : Status 0 - Fully active, able to carry on all pre-disease performance without restriction [Normal] : affect appropriate [de-identified] : skin rash

## 2021-05-18 NOTE — CONSULT LETTER
[Dear  ___] : Dear  [unfilled], [Consult Letter:] : I had the pleasure of evaluating your patient, [unfilled]. [Please see my note below.] : Please see my note below. [Consult Closing:] : Thank you very much for allowing me to participate in the care of this patient.  If you have any questions, please do not hesitate to contact me. [Sincerely,] : Sincerely, [DrIveth  ___] : Dr. BETANCOURT [DrIveth ___] : Dr. BETANCOURT [FreeTextEntry3] : Brittany Bahena MD\par [facility] [place] at [facility]\par

## 2021-05-18 NOTE — REVIEW OF SYSTEMS
[Muscle Pain] : muscle pain [Negative] : Allergic/Immunologic [Skin Rash] : skin rash [FreeTextEntry9] : lower back pain

## 2021-05-18 NOTE — HISTORY OF PRESENT ILLNESS
[de-identified] : 79 year old male refereed by Dr. Omer Norman for initial consultation to rule out possible MDS versus dendritic cell leukemia/sarcoma.  Patient had asymptomatic dermatitis on his forearms intermittently over the past 6 months.  The dermatitis consists of one to two centimeters minimally elevated violet-pink plaques.  He had a biopsy on 8/27/19 at [place] which showed histiocytoid Sweet's syndrome.  Flow cytometry was sent on 9/10/19 by Dr. Norman which showed no morphological or immunophenotypic evidence of lymphoproliferate disorder, high grade MDS or Acute leukemia.  Hgb 11.1, HCT 35.5, MCH 24.8, ESR 90, CRP 83\par \par Hgb 11.1, HCT 35.5, MCH 24.8, ESR 90, CRP 83.  Patient sees GI had capsule study and up to date with coloscopy was told he had a little "leak" sees Dr. Aguilar, he also reports losing 10-12 lbs over the past 6 years.  He reports having a personal history of prostate cancer in 2002 and had a prostatectomy.  His brother has prostate cancer, denies any other family history of malignant or hematological disorders.  \par \par He follows with Dr. Radford for a connective tissue disorder.   [de-identified] : Patient being seen for follow up of MDS and anemia. Patient states he is feeling well at this time. He reports no new or worsening complaints.

## 2021-05-28 ENCOUNTER — APPOINTMENT (OUTPATIENT)
Dept: GASTROENTEROLOGY | Facility: CLINIC | Age: 81
End: 2021-05-28
Payer: MEDICARE

## 2021-05-28 VITALS
WEIGHT: 143 LBS | SYSTOLIC BLOOD PRESSURE: 134 MMHG | TEMPERATURE: 97.4 F | BODY MASS INDEX: 21.67 KG/M2 | HEIGHT: 68 IN | DIASTOLIC BLOOD PRESSURE: 88 MMHG

## 2021-05-28 VITALS — HEART RATE: 86 BPM | OXYGEN SATURATION: 98 %

## 2021-05-28 PROCEDURE — 99214 OFFICE O/P EST MOD 30 MIN: CPT

## 2021-05-28 RX ORDER — CALCIPOTRIENE 50 UG/G
0.01 OINTMENT TOPICAL
Qty: 60 | Refills: 0 | Status: DISCONTINUED | COMMUNITY
Start: 2021-01-05 | End: 2021-05-28

## 2021-05-28 NOTE — ASSESSMENT
[FreeTextEntry1] : Will plan on a capsule study for iron deficiency anemia given questionable angioectasias on his most recent capsule study in 2016.  Would be willing to have a deep enteroscopy if necessary.

## 2021-06-30 ENCOUNTER — APPOINTMENT (OUTPATIENT)
Dept: GASTROENTEROLOGY | Facility: HOSPITAL | Age: 81
End: 2021-06-30

## 2021-08-02 ENCOUNTER — APPOINTMENT (OUTPATIENT)
Dept: GASTROENTEROLOGY | Facility: CLINIC | Age: 81
End: 2021-08-02
Payer: MEDICARE

## 2021-08-02 DIAGNOSIS — R19.4 CHANGE IN BOWEL HABIT: ICD-10-CM

## 2021-08-02 PROCEDURE — 99443: CPT | Mod: 95

## 2021-08-02 NOTE — HISTORY OF PRESENT ILLNESS
[FreeTextEntry1] : Mr. Jaime is a pleasant 80M h/o MDS, anemia, Sweet syndrome, mixed connective tissue disease, HLD, HTN, cholecystectomy, prostatectomy who follows up via a Telephonic visit today.\par \par Main complaint today is several weeks of a change in bowel habits.  Has had loose stools multiple times a day with associated lower abd cramping.  Stool is soft, semi-formed, brown. No blood or mucus.\par \par Has been losing weight the past several years unintentionally.\par \par Has not had stool studies.\par \par Has had an extensive prior endoscopic workup.  Recent capsule study with myself for iron deficiency which was unremarkable.\par \par No fevers or chills.\par \par Has not tried any recent abx.\par \par No recent change in his medications.\par \par Is on prednisone chronically.\par \par Has continued his metamucil.\par \par Prior history below:\par He had a prior GI workup including a colonoscopy 2017 showing diverticulosis and internal hemorrhoids, EGD 2019 without a clear source of his iron deficiency.\par \par He states he had a small bowel video capsule study likely in 2019 which showed "leakage" of blood into his small bowel, however he does not have these records, nor was he recommended for a small bowel enteroscopy.\par \par CT A/P 12/3/20 (see scanned report) showing signs c/w reflux, no other abnormalities seen.

## 2021-08-02 NOTE — ASSESSMENT
[FreeTextEntry1] : Will check stool studies as listed.\par \par If negative, could consider a course of abx empirically for SIBO.

## 2021-08-02 NOTE — REASON FOR VISIT
[Home] : at home, [unfilled] , at the time of the visit. [Medical Office: ([facility])___] : at the medical office located in  [Follow-Up: _____] : a [unfilled] follow-up visit

## 2021-08-17 LAB
BACTERIA STL CULT: NORMAL
C DIFF TOX GENS STL QL NAA+PROBE: NORMAL
CALPROTECTIN FECAL: 36 UG/G
CDIFF BY PCR: NOT DETECTED
E HISTOLYT AG STL IA-ACNC: NOT DETECTED
G LAMBLIA AG STL QL: NORMAL

## 2021-08-18 LAB
DEPRECATED O AND P PREP STL: NORMAL
LACTOFERRIN STL-MCNC: <1
PANCREATIC ELASTASE, FECAL: 366

## 2021-10-18 ENCOUNTER — RESULT REVIEW (OUTPATIENT)
Age: 81
End: 2021-10-18

## 2021-10-18 ENCOUNTER — APPOINTMENT (OUTPATIENT)
Dept: HEMATOLOGY ONCOLOGY | Facility: CLINIC | Age: 81
End: 2021-10-18
Payer: MEDICARE

## 2021-10-18 VITALS
BODY MASS INDEX: 21.22 KG/M2 | OXYGEN SATURATION: 100 % | WEIGHT: 140 LBS | RESPIRATION RATE: 18 BRPM | DIASTOLIC BLOOD PRESSURE: 74 MMHG | HEIGHT: 68 IN | TEMPERATURE: 97.2 F | HEART RATE: 86 BPM | SYSTOLIC BLOOD PRESSURE: 126 MMHG

## 2021-10-18 DIAGNOSIS — D64.9 ANEMIA, UNSPECIFIED: ICD-10-CM

## 2021-10-18 DIAGNOSIS — K21.9 GASTRO-ESOPHAGEAL REFLUX DISEASE W/OUT ESOPHAGITIS: ICD-10-CM

## 2021-10-18 PROCEDURE — 99214 OFFICE O/P EST MOD 30 MIN: CPT

## 2021-10-18 PROCEDURE — 36415 COLL VENOUS BLD VENIPUNCTURE: CPT

## 2021-10-18 RX ORDER — AMOXICILLIN 500 MG/1
500 TABLET, FILM COATED ORAL
Qty: 20 | Refills: 0 | Status: COMPLETED | COMMUNITY
Start: 2021-08-17 | End: 2021-10-18

## 2021-10-18 NOTE — HISTORY OF PRESENT ILLNESS
[de-identified] : 79 year old male refereed by Dr. Omer Norman for initial consultation to rule out possible MDS versus dendritic cell leukemia/sarcoma.  Patient had asymptomatic dermatitis on his forearms intermittently over the past 6 months.  The dermatitis consists of one to two centimeters minimally elevated violet-pink plaques.  He had a biopsy on 8/27/19 at [place] which showed histiocytoid Sweet's syndrome.  Flow cytometry was sent on 9/10/19 by Dr. Norman which showed no morphological or immunophenotypic evidence of lymphoproliferate disorder, high grade MDS or Acute leukemia.  Hgb 11.1, HCT 35.5, MCH 24.8, ESR 90, CRP 83\par \par Hgb 11.1, HCT 35.5, MCH 24.8, ESR 90, CRP 83.  Patient sees GI had capsule study and up to date with coloscopy was told he had a little "leak" sees Dr. Aguilar, he also reports losing 10-12 lbs over the past 6 years.  He reports having a personal history of prostate cancer in 2002 and had a prostatectomy.  His brother has prostate cancer, denies any other family history of malignant or hematological disorders.  \par \par He follows with Dr. Radford for a connective tissue disorder.   [de-identified] : Patient presents for follow up of MDS and anemia. \par He denies any changes since last visit. Reports feeling well today.\par States he lost a few pounds - admits he has not been eating well.

## 2021-10-18 NOTE — CONSULT LETTER
[Dear  ___] : Dear  [unfilled], [Consult Letter:] : I had the pleasure of evaluating your patient, [unfilled]. [Please see my note below.] : Please see my note below. [Consult Closing:] : Thank you very much for allowing me to participate in the care of this patient.  If you have any questions, please do not hesitate to contact me. [Sincerely,] : Sincerely, [DrIveth  ___] : Dr. BETANCOURT [DrIveth ___] : Dr. BETANCOURT [FreeTextEntry3] : Brittany Bahena MD\par [facility] [place] at [facility]\par

## 2021-10-18 NOTE — REVIEW OF SYSTEMS
[Muscle Pain] : muscle pain [Skin Rash] : skin rash [Negative] : Allergic/Immunologic [FreeTextEntry9] : lower back pain

## 2021-10-18 NOTE — PHYSICAL EXAM
[Fully active, able to carry on all pre-disease performance without restriction] : Status 0 - Fully active, able to carry on all pre-disease performance without restriction [Normal] : affect appropriate [de-identified] : skin rash

## 2021-10-18 NOTE — ASSESSMENT
[FreeTextEntry1] : 81 yo male with history 12 years with sweats, fevers,  dermatitis, weight loss diagnosed with undifferentiated mixed connective tissue disease treated with prednisone 5mg.  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 \par \par Bone marrow biopsy - hypercellular marrow with normal FISH and absent iron stores.\par Reviewed with patient -c/w MDS, will replace IV iron prior to starting Aranesp.\par \par Worse balance - undergoing neuro evaluation \par Hg 13.3 gm/dl- no Aranesp today\par  \par Persistently elevated inflammatory markers ESR, CRP  - Dr. Fay Radford, on colchicine \par \par 10/18/2021\par Stills disease - feels worse\par Under care of Dr. Fay Radford for rheum \par Flare up of pain, dizziness in am, undergoing vestibular training\par Fatigue, very tired.\par Hg 11.4 - slight decrease, likely secondary to inflammatory process\par Insomnia, significant amount of pain.\par Referred for evaluation for  medical cannabis to palliative care.

## 2022-02-28 ENCOUNTER — APPOINTMENT (OUTPATIENT)
Dept: HEMATOLOGY ONCOLOGY | Facility: CLINIC | Age: 82
End: 2022-02-28
Payer: MEDICARE

## 2022-03-07 ENCOUNTER — RESULT REVIEW (OUTPATIENT)
Age: 82
End: 2022-03-07

## 2022-03-07 ENCOUNTER — APPOINTMENT (OUTPATIENT)
Dept: HEMATOLOGY ONCOLOGY | Facility: CLINIC | Age: 82
End: 2022-03-07
Payer: MEDICARE

## 2022-03-07 VITALS
RESPIRATION RATE: 18 BRPM | TEMPERATURE: 97.6 F | HEART RATE: 81 BPM | DIASTOLIC BLOOD PRESSURE: 50 MMHG | OXYGEN SATURATION: 97 % | WEIGHT: 143 LBS | HEIGHT: 67.99 IN | BODY MASS INDEX: 21.67 KG/M2 | SYSTOLIC BLOOD PRESSURE: 127 MMHG

## 2022-03-07 DIAGNOSIS — G89.4 CHRONIC PAIN SYNDROME: ICD-10-CM

## 2022-03-07 PROCEDURE — 36415 COLL VENOUS BLD VENIPUNCTURE: CPT

## 2022-03-07 PROCEDURE — 99213 OFFICE O/P EST LOW 20 MIN: CPT | Mod: 25

## 2022-03-07 RX ORDER — PAROXETINE HYDROCHLORIDE 40 MG/1
40 TABLET, FILM COATED ORAL
Refills: 0 | Status: ACTIVE | COMMUNITY

## 2022-03-07 RX ORDER — MUPIROCIN 20 MG/G
2 OINTMENT TOPICAL
Qty: 44 | Refills: 0 | Status: COMPLETED | COMMUNITY
Start: 2020-11-23 | End: 2022-03-07

## 2022-03-07 RX ORDER — FAMOTIDINE 20 MG/1
20 TABLET, FILM COATED ORAL TWICE DAILY
Qty: 60 | Refills: 2 | Status: COMPLETED | COMMUNITY
Start: 2021-07-06 | End: 2022-03-07

## 2022-03-07 RX ORDER — METOPROLOL SUCCINATE 25 MG/1
25 TABLET, EXTENDED RELEASE ORAL
Refills: 0 | Status: COMPLETED | COMMUNITY
End: 2022-03-07

## 2022-03-07 NOTE — ASSESSMENT
[FreeTextEntry1] : 80 yo male with history 12 years with sweats, fevers,  dermatitis, weight loss diagnosed with undifferentiated mixed connective tissue disease treated with prednisone 5mg.  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 \par \par Bone marrow biopsy - hypercellular marrow with normal FISH and absent iron stores.\par Reviewed with patient -c/w MDS, will replace IV iron prior to starting Aranesp.\par \par Persistently elevated inflammatory markers ESR, CRP  - Dr. Fay Radford, on colchicine \par \par Stills disease -feels slightly better, on methylpred.\par Under care of Dr. Fay Radford for rheum \par Flare up of pain, dizziness in am, undergoing vestibular training\par Fatigue, very tired.\par Hg 12.2 - stable\par Insomnia, significant amount of pain, decreased appetite.\par Referred for evaluation for  medical cannabis to palliative care.\par \par Blood drawn in office. Ordered and reviewed.\par

## 2022-03-07 NOTE — CONSULT LETTER
[FreeTextEntry3] : Brittany Bahena MD\par [facility] [place] at [facility]\par

## 2022-03-07 NOTE — HISTORY OF PRESENT ILLNESS
[de-identified] : 79 year old male refereed by Dr. Omer Norman for initial consultation to rule out possible MDS versus dendritic cell leukemia/sarcoma.  Patient had asymptomatic dermatitis on his forearms intermittently over the past 6 months.  The dermatitis consists of one to two centimeters minimally elevated violet-pink plaques.  He had a biopsy on 8/27/19 at [place] which showed histiocytoid Sweet's syndrome.  Flow cytometry was sent on 9/10/19 by Dr. Norman which showed no morphological or immunophenotypic evidence of lymphoproliferate disorder, high grade MDS or Acute leukemia.  Hgb 11.1, HCT 35.5, MCH 24.8, ESR 90, CRP 83\par \par Hgb 11.1, HCT 35.5, MCH 24.8, ESR 90, CRP 83.  Patient sees GI had capsule study and up to date with coloscopy was told he had a little "leak" sees Dr. Aguilar, he also reports losing 10-12 lbs over the past 6 years.  He reports having a personal history of prostate cancer in 2002 and had a prostatectomy.  His brother has prostate cancer, denies any other family history of malignant or hematological disorders.  \par \par He follows with Dr. Radford for a connective tissue disorder.   [de-identified] : Patient presents for follow up of MDS and anemia. Patient offers no new complaints at this time. He states that his appetite is 'not that strong.' He states that his weight fluctuates 2-3 pounds.

## 2022-07-11 ENCOUNTER — APPOINTMENT (OUTPATIENT)
Dept: GASTROENTEROLOGY | Facility: CLINIC | Age: 82
End: 2022-07-11

## 2023-05-02 ENCOUNTER — APPOINTMENT (OUTPATIENT)
Dept: HEMATOLOGY ONCOLOGY | Facility: CLINIC | Age: 83
End: 2023-05-02
Payer: MEDICARE

## 2023-05-02 ENCOUNTER — RESULT REVIEW (OUTPATIENT)
Age: 83
End: 2023-05-02

## 2023-05-02 DIAGNOSIS — D46.Z OTHER MYELODYSPLASTIC SYNDROMES: ICD-10-CM

## 2023-05-02 DIAGNOSIS — M35.9 SYSTEMIC INVOLVEMENT OF CONNECTIVE TISSUE, UNSPECIFIED: ICD-10-CM

## 2023-05-02 DIAGNOSIS — L98.2 FEBRILE NEUTROPHILIC DERMATOSIS [SWEET]: ICD-10-CM

## 2023-05-02 DIAGNOSIS — D50.0 IRON DEFICIENCY ANEMIA SECONDARY TO BLOOD LOSS (CHRONIC): ICD-10-CM

## 2023-05-02 PROCEDURE — 99214 OFFICE O/P EST MOD 30 MIN: CPT | Mod: 25

## 2023-05-02 PROCEDURE — 36415 COLL VENOUS BLD VENIPUNCTURE: CPT

## 2023-05-02 RX ORDER — CLOPIDOGREL BISULFATE 75 MG/1
75 TABLET, FILM COATED ORAL
Refills: 0 | Status: ACTIVE | COMMUNITY

## 2023-05-02 NOTE — HISTORY OF PRESENT ILLNESS
[de-identified] : Patient presents for follow up of MDS and anemia. \par He states that his fatigue has gotten worse in the last couple months, he states that his appetite is still worse and he states that he has been developing chills intermittently. \par Patient has a stent placed in his carotid artery about 6 months.  [de-identified] : 79 year old male refereed by Dr. Omer Norman for initial consultation to rule out possible MDS versus dendritic cell leukemia/sarcoma.  Patient had asymptomatic dermatitis on his forearms intermittently over the past 6 months.  The dermatitis consists of one to two centimeters minimally elevated violet-pink plaques.  He had a biopsy on 8/27/19 at [place] which showed histiocytoid Sweet's syndrome.  Flow cytometry was sent on 9/10/19 by Dr. Norman which showed no morphological or immunophenotypic evidence of lymphoproliferate disorder, high grade MDS or Acute leukemia.  Hgb 11.1, HCT 35.5, MCH 24.8, ESR 90, CRP 83\par \par Hgb 11.1, HCT 35.5, MCH 24.8, ESR 90, CRP 83.  Patient sees GI had capsule study and up to date with coloscopy was told he had a little "leak" sees Dr. Aguilar, he also reports losing 10-12 lbs over the past 6 years.  He reports having a personal history of prostate cancer in 2002 and had a prostatectomy.  His brother has prostate cancer, denies any other family history of malignant or hematological disorders.  \par \par He follows with Dr. Radford for a connective tissue disorder.

## 2023-05-02 NOTE — PHYSICAL EXAM
[Fully active, able to carry on all pre-disease performance without restriction] : Status 0 - Fully active, able to carry on all pre-disease performance without restriction [Normal] : affect appropriate [de-identified] : skin rash

## 2023-05-02 NOTE — CONSULT LETTER
[Dear  ___] : Dear  [unfilled], [Consult Letter:] : I had the pleasure of evaluating your patient, [unfilled]. [Please see my note below.] : Please see my note below. [Consult Closing:] : Thank you very much for allowing me to participate in the care of this patient.  If you have any questions, please do not hesitate to contact me. [Sincerely,] : Sincerely, [DrIveth  ___] : Dr. BETANCOURT [DrIveth ___] : Dr. BETANCOURT [FreeTextEntry3] : Brittany Bahena MD\par [facility] [place] at [facility]\par

## 2023-05-02 NOTE — ASSESSMENT
[FreeTextEntry1] : 83 yo male with history 12 years with sweats, fevers,  dermatitis, weight loss diagnosed with undifferentiated mixed connective tissue disease treated with prednisone 5mg.  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 \par \par Bone marrow biopsy - hypercellular marrow with normal FISH and absent iron stores.\par Reviewed with patient -c/w MDS, will replace IV iron prior to starting Aranesp.\par \par Persistently elevated inflammatory markers ESR, CRP  - Dr. Fay Radford, on colchicine \par \par Stills disease -feels slightly better, on methylpred.\par Under care of Dr. Fay Radford for rheum \par Flare up of pain, dizziness in am, undergoing vestibular training\par Fatigue, very tired.\par Hg 11.2- stable\par Insomnia, significant amount of pain, decreased appetite.\par Referred for evaluation for  medical cannabis to palliative care.\par Seen at [facility] - suggested Actemra\par \par cardiac cath - had stent done, on Plavix \par Blood drawn in office. Ordered and reviewed.\par

## 2023-10-01 PROBLEM — M35.9 CONNECTIVE TISSUE DISEASE: Status: ACTIVE | Noted: 2019-11-19

## 2024-05-07 ENCOUNTER — APPOINTMENT (OUTPATIENT)
Dept: HEMATOLOGY ONCOLOGY | Facility: CLINIC | Age: 84
End: 2024-05-07